# Patient Record
Sex: MALE | Race: WHITE | URBAN - METROPOLITAN AREA
[De-identification: names, ages, dates, MRNs, and addresses within clinical notes are randomized per-mention and may not be internally consistent; named-entity substitution may affect disease eponyms.]

---

## 2020-03-17 ENCOUNTER — TELEPHONE (OUTPATIENT)
Dept: INTERNAL MEDICINE CLINIC | Facility: CLINIC | Age: 25
End: 2020-03-17

## 2020-03-17 DIAGNOSIS — J45.30 MILD PERSISTENT ASTHMA WITHOUT COMPLICATION: Primary | ICD-10-CM

## 2020-03-17 RX ORDER — FLUTICASONE PROPIONATE 44 UG/1
2 AEROSOL, METERED RESPIRATORY (INHALATION) 2 TIMES DAILY
Qty: 2 INHALER | Refills: 0 | Status: SHIPPED | OUTPATIENT
Start: 2020-03-17

## 2020-03-17 RX ORDER — ALBUTEROL SULFATE 90 UG/1
AEROSOL, METERED RESPIRATORY (INHALATION)
Qty: 2 INHALER | Refills: 0 | Status: SHIPPED | OUTPATIENT
Start: 2020-03-17

## 2020-03-17 NOTE — TELEPHONE ENCOUNTER
Patient's mother called in     He's a former Dr Ranjana Larry patient & never re-established with a provider like she told him to  He wants to see Dr Wing Baldwin but doesn't want to come in and risk getting sick  Dr Ranjana Larry had him on the Flovent for his asthma & now he is out of refills  Wanted to know if you could send one in just incase he runs out with everything going on with the Virus  Laiequiel Riedel is a [facility] patient also      Please call her back & let her know [phone]

## 2020-03-17 NOTE — TELEPHONE ENCOUNTER
MOTHER CALLED BACK AND STATED HE IS ON FLOVENT AS MAINTENANCE 44 MCG AND VENTOLIN 90 MCG  FOR EMERGENCY

## 2020-03-17 NOTE — TELEPHONE ENCOUNTER
What dose of Flovent is he on?     Usually I wouldn't send something in because he hasn't been seen in this office in a long time, but given current circumstances I can send it in this one time    Usual Flovent doses are:  - 44 mcg OR  - 110 mcg OR  - 220 mcg